# Patient Record
Sex: FEMALE | Race: WHITE | NOT HISPANIC OR LATINO | Employment: STUDENT | URBAN - METROPOLITAN AREA
[De-identification: names, ages, dates, MRNs, and addresses within clinical notes are randomized per-mention and may not be internally consistent; named-entity substitution may affect disease eponyms.]

---
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
## 2021-02-10 ENCOUNTER — OFFICE VISIT (OUTPATIENT)
Dept: PLASTIC SURGERY | Facility: CLINIC | Age: 21
End: 2021-02-10
Payer: MEDICAID

## 2021-02-10 DIAGNOSIS — Q30.2 CLEFT LIP NASAL DEFORMITY: Primary | ICD-10-CM

## 2021-02-10 DIAGNOSIS — Q36.9 CLEFT LIP NASAL DEFORMITY: Primary | ICD-10-CM

## 2021-02-10 PROCEDURE — 99999 PR PBB SHADOW E&M-EST. PATIENT-LVL I: CPT | Mod: PBBFAC,,, | Performed by: PLASTIC SURGERY

## 2021-02-10 PROCEDURE — 99213 OFFICE O/P EST LOW 20 MIN: CPT | Mod: S$PBB,,, | Performed by: PLASTIC SURGERY

## 2021-02-10 PROCEDURE — 99213 PR OFFICE/OUTPT VISIT, EST, LEVL III, 20-29 MIN: ICD-10-PCS | Mod: S$PBB,,, | Performed by: PLASTIC SURGERY

## 2021-02-10 PROCEDURE — 99211 OFF/OP EST MAY X REQ PHY/QHP: CPT | Mod: PBBFAC | Performed by: PLASTIC SURGERY

## 2021-02-10 PROCEDURE — 99999 PR PBB SHADOW E&M-EST. PATIENT-LVL I: ICD-10-PCS | Mod: PBBFAC,,, | Performed by: PLASTIC SURGERY

## (undated) DEVICE — SEE MEDLINE ITEM 156913

## (undated) DEVICE — BLADE MINI BLADE ORANGE

## (undated) DEVICE — SPONGE PATTY SURGICAL .5X3IN

## (undated) DEVICE — NDL MICRODISSECTION 3CM 3/32

## (undated) DEVICE — SET IV ADMIN 15DROP 3 CARESITE

## (undated) DEVICE — SOL NS 1000CC

## (undated) DEVICE — HANDPIECE REFLUX ULTRA 45

## (undated) DEVICE — SPLINT NASAL AIRWAY SEPTAL SIL

## (undated) DEVICE — MARKERS SPHERZ PASSIVE

## (undated) DEVICE — TRAY FOLEY 16FR INFECTION CONT